# Patient Record
Sex: FEMALE | Race: WHITE
[De-identification: names, ages, dates, MRNs, and addresses within clinical notes are randomized per-mention and may not be internally consistent; named-entity substitution may affect disease eponyms.]

---

## 2022-01-22 ENCOUNTER — HOSPITAL ENCOUNTER (EMERGENCY)
Dept: HOSPITAL 75 - ER | Age: 32
Discharge: HOME | End: 2022-01-22
Payer: SELF-PAY

## 2022-01-22 VITALS — SYSTOLIC BLOOD PRESSURE: 111 MMHG | DIASTOLIC BLOOD PRESSURE: 61 MMHG

## 2022-01-22 DIAGNOSIS — U07.1: Primary | ICD-10-CM

## 2022-01-22 PROCEDURE — 99283 EMERGENCY DEPT VISIT LOW MDM: CPT

## 2022-01-22 PROCEDURE — 87635 SARS-COV-2 COVID-19 AMP PRB: CPT

## 2022-01-22 NOTE — ED GENERAL
General


Chief Complaint:  COVID19 Suspect/Confirmed


Stated Complaint:  HOME TEST +,BODY ACHES,N/V,CONGESTION,COUGH


Nursing Triage Note:  


AMB TO ROOM 05.  STATES SHE TESTED POSITIVE FOR COVID WITH A HOME TEST TODAY.  


PT IS TO WORK TONIGHT AND HER BOSS IT WILL TAKE A HOME TEST ET NEEDS IT 


CONFIRMED.


Source of Information:  Patient


Exam Limitations:  No Limitations





History of Present Illness


Date Seen by Provider:  Jan 22, 2022


Time Seen by Provider:  17:19


Initial Comments


This is a well-appearing 31-year-old female who presented to the ER to have her 

home COVID test confirmed.  States that her boss informed her that she needed to

go to the emergency department to have her test confirmed.  States that she 

tested positive for COVID yesterday and today.  She is having chills, body 

aches, congestion, cough.  Had fever 2 days ago but states the fever is no 

longer present.





Physical Exam


Vital Signs





Vital Signs - First Documented








 1/22/22





 17:10


 


Temp 36.8


 


Pulse 93


 


Resp 16


 


B/P (MAP) 111/61 (78)


 


Pulse Ox 98


 


O2 Delivery Room Air





Capillary Refill : Less Than 3 Seconds


Height, Weight, BMI


Height: '"


Weight: lbs. oz. kg;  BMI


Method:





Progress/Results/Core Measures


Suspected Sepsis


SIRS


Temperature: 


Pulse: 93 


Respiratory Rate: 16


 


Blood Pressure 111 /61 


Mean: 78





Results/Orders


My Orders





Orders - MIHAELA NETTLES


Coronavirus Sars-Cov-2 So 2019 (1/22/22 17:24)





Vital Signs/I&O











 1/22/22





 17:10


 


Temp 36.8


 


Pulse 93


 


Resp 16


 


B/P (MAP) 111/61 (78)


 


Pulse Ox 98


 


O2 Delivery Room Air





Capillary Refill : Less Than 3 Seconds








Blood Pressure Mean:                    78











Departure


Impression





   Primary Impression:  


   Suspected COVID-19 virus infection


Disposition:  01 HOME, SELF-CARE


Condition:  Unchanged





Departure-Patient Inst.


Decision time for Depature:  17:34


Referrals:  


NO,LOCAL PHYSICIAN (PCP/Family)


Primary Care Physician


Patient Instructions:  COVID-19 Overview





Add. Discharge Instructions:  


Plan: 


1. Isolate at home until you have the results of your COVID-19 test. 


2. May take Tylenol or Ibuprofen as needed for discomfort and fever. 


3. You will receive your results in 24-48 hours.


4. Return to ER for any new, concerning, or worsening symptoms. 


 


All discharge instructions reviewed with patient and/or family. Voiced 

understanding.


Work/School Note:  Work Release Form   Date Seen in the Emergency Department:  

Jan 22, 2022


   Return to Work:  Jan 24, 2022











MIHAELA NETTLES           Jan 22, 2022 17:37